# Patient Record
Sex: FEMALE | Race: WHITE | NOT HISPANIC OR LATINO | Employment: OTHER | URBAN - METROPOLITAN AREA
[De-identification: names, ages, dates, MRNs, and addresses within clinical notes are randomized per-mention and may not be internally consistent; named-entity substitution may affect disease eponyms.]

---

## 2017-03-07 ENCOUNTER — APPOINTMENT (OUTPATIENT)
Dept: PULMONOLOGY | Facility: HOSPICE | Age: 72
End: 2017-03-07
Payer: MEDICARE

## 2017-03-24 ENCOUNTER — OFFICE VISIT (OUTPATIENT)
Dept: PULMONOLOGY | Facility: HOSPICE | Age: 72
End: 2017-03-24
Payer: MEDICARE

## 2017-03-24 VITALS
HEIGHT: 60 IN | WEIGHT: 99 LBS | BODY MASS INDEX: 19.44 KG/M2 | SYSTOLIC BLOOD PRESSURE: 122 MMHG | RESPIRATION RATE: 16 BRPM | HEART RATE: 88 BPM | OXYGEN SATURATION: 98 % | DIASTOLIC BLOOD PRESSURE: 78 MMHG

## 2017-03-24 DIAGNOSIS — J44.9 CHRONIC OBSTRUCTIVE PULMONARY DISEASE, UNSPECIFIED COPD TYPE (HCC): ICD-10-CM

## 2017-03-24 DIAGNOSIS — R05.8 PRODUCTIVE COUGH: ICD-10-CM

## 2017-03-24 DIAGNOSIS — Z99.81 OXYGEN DEPENDENT: ICD-10-CM

## 2017-03-24 PROCEDURE — 4040F PNEUMOC VAC/ADMIN/RCVD: CPT | Performed by: NURSE PRACTITIONER

## 2017-03-24 PROCEDURE — G8484 FLU IMMUNIZE NO ADMIN: HCPCS | Performed by: NURSE PRACTITIONER

## 2017-03-24 PROCEDURE — 99214 OFFICE O/P EST MOD 30 MIN: CPT | Performed by: NURSE PRACTITIONER

## 2017-03-24 PROCEDURE — G8432 DEP SCR NOT DOC, RNG: HCPCS | Performed by: NURSE PRACTITIONER

## 2017-03-24 PROCEDURE — 1101F PT FALLS ASSESS-DOCD LE1/YR: CPT | Mod: 8P | Performed by: NURSE PRACTITIONER

## 2017-03-24 PROCEDURE — 3014F SCREEN MAMMO DOC REV: CPT | Mod: 8P | Performed by: NURSE PRACTITIONER

## 2017-03-24 PROCEDURE — 1036F TOBACCO NON-USER: CPT | Performed by: NURSE PRACTITIONER

## 2017-03-24 PROCEDURE — 3017F COLORECTAL CA SCREEN DOC REV: CPT | Mod: 8P | Performed by: NURSE PRACTITIONER

## 2017-03-24 PROCEDURE — G8420 CALC BMI NORM PARAMETERS: HCPCS | Performed by: NURSE PRACTITIONER

## 2017-03-24 RX ORDER — AZITHROMYCIN 250 MG/1
TABLET, FILM COATED ORAL
Qty: 6 TAB | Refills: 0 | Status: SHIPPED | OUTPATIENT
Start: 2017-03-24 | End: 2017-09-29 | Stop reason: SDUPTHER

## 2017-03-24 RX ORDER — PREDNISONE 10 MG/1
TABLET ORAL
Qty: 18 TAB | Refills: 0 | Status: SHIPPED | OUTPATIENT
Start: 2017-03-24 | End: 2017-10-24

## 2017-03-24 NOTE — PATIENT INSTRUCTIONS
1. Continue inhaler regimen. Continue nebulizer 2-4x's daily.  2. RX for zpak/prednisone for emergencies.  3. Order sent to [facility] for smaller o2 tanks.  4. [place] with DLCO at next OV.  5. Follow up in 4 months with Kristina CLEVELAND, sooner if needed.   6. Due for Pneumovax 23 9/2017.

## 2017-03-24 NOTE — MR AVS SNAPSHOT
Yolis Armstrong   3/24/2017 9:20 AM   Office Visit   MRN: [id number]    Department:  [facility]   Dept Phone:  [phone]    Description:  Female : 1945   Provider:  SCAR HurtadoRSHANNAN.           Reason for Visit     Follow-Up           Allergies as of 3/24/2017     Allergen Noted Reactions    Amoxicillin 2016       Codeine 2016       Levaquin 2016       Sulfa Drugs 2016         You were diagnosed with     Chronic obstructive pulmonary disease, unspecified COPD type (CMS-HCC)   [1130907]       Oxygen dependent   [508252]       Productive cough   [997454]         Vital Signs     Blood Pressure Pulse Respirations Height Weight Body Mass Index    122/78 mmHg 88 16 1.524 m (5') 44.906 kg (99 lb) 19.33 kg/m2    Oxygen Saturation Smoking Status                98% Former Smoker          Basic Information     Date Of Birth Sex Race Ethnicity Preferred Language    1945 Female White Non- English      Your appointments     2017  9:00 AM   Pulmonary Function Test with PFT-RM3   [facility] (--)    [address] [facility] [address]  [place] [id number] [phone]            2017 10:00 AM   Established Patient Pul with RITA Humphrey   [facility] (--)    [address] [facility] [address]  [place] [id number] [phone]              Problem List              ICD-10-CM Priority Class Noted - Resolved    COPD (chronic obstructive pulmonary disease) (CMS-HCC) J44.9   2016 - Present    Oxygen dependent Z99.81   3/24/2017 - Present    Productive cough R05   3/24/2017 - Present      Health Maintenance        Date Due Completion Dates    IMM DTaP/Tdap/Td Vaccine (1 - Tdap) 1962 ---    PAP SMEAR 1964 ---    COLONOSCOPY 1993 ---    IMM ZOSTER VACCINE 2003 ---    BONE DENSITY 2008 ---    MAMMOGRAM 2/3/2011 2/3/2010, 2/3/2010, 10/2/2007, 10/2/2007, 3/27/2006, 3/23/2005, 3/18/2004     IMM INFLUENZA (1) 9/1/2016 ---    IMM PNEUMOCOCCAL 65+ (ADULT) LOW/MEDIUM RISK SERIES (2 of 2 - PPSV23) 9/7/2017 9/7/2016            Current Immunizations     13-VALENT PCV PREVNAR 9/7/2016      Below and/or attached are the medications your provider expects you to take. Review all of your home medications and newly ordered medications with your provider and/or pharmacist. Follow medication instructions as directed by your provider and/or pharmacist. Please keep your medication list with you and share with your provider. Update the information when medications are discontinued, doses are changed, or new medications (including over-the-counter products) are added; and carry medication information at all times in the event of emergency situations     Allergies:  AMOXICILLIN - (reactions not documented)     CODEINE - (reactions not documented)     LEVAQUIN - (reactions not documented)     SULFA DRUGS - (reactions not documented)               Medications  Valid as of: March 24, 2017 -  9:59 AM    Generic Name Brand Name Tablet Size Instructions for use    Albuterol Sulfate (Nebu Soln) PROVENTIL 2.5mg/3ml 2.5 mg by Nebulization route every four hours as needed for Shortness of Breath.        Azithromycin (Tab) ZITHROMAX 250 MG TAKE 2 TABLETS BY MOUTH TODAY, THEN TAKE 1 TABLET DAILY FOR 4 DAYS        Benzonatate (Cap) TESSALON 100 MG TAKE 1-2 CAPS EVERY 8 HRS AS NEEDED FOR VIGOROUS COUGH        Budesonide-Formoterol Fumarate (Aerosol) SYMBICORT 160-4.5 MCG/ACT Inhale 2 Puffs by mouth 2 Times a Day.        BusPIRone HCl (Tab) BUSPAR 7.5 MG Take 7.5 mg by mouth 3 times a day.        Calcium   Take  by mouth every day.        Docusate Calcium   Take  by mouth as needed.        Fluticasone Propionate (Suspension) FLONASE 50 MCG/ACT SQUIRT 1-2 SPRAYS INTO EACH NOSTRIL EVERY DAY        Fluticasone Propionate (Inhal) (AEROSOL POWDER, BREATH ACTIVATED) Fluticasone Propionate (Inhal) 50 MCG/BLIST Inhale  by mouth every day.  1-2 sprays each nostril daily.        Hydrocodone-Acetaminophen (Tab) VICODIN ES 7.5-750 MG Take 1 Tab by mouth as needed.        Ipratropium-Albuterol (Aero Soln) COMBIVENT RESPIMAT  MCG/ACT Inhale 1 Puff by mouth 4 times a day as needed (for shortness of breath/wheezing).        LORazepam (Tab) ATIVAN 1 MG Take 1 mg by mouth every day. 1-3 tabs daily as needed for anxiety.        Potassium (Tab) Potassium 75 MG Take  by mouth every day.        PredniSONE (Tab) DELTASONE 10 MG Take 30mg x 3 days, then take 20mg x 3 days, then take 10mg x 3 days, with food, then discontinue.        Tiotropium Bromide Monohydrate (Aero Soln) Tiotropium Bromide Monohydrate 2.5 MCG/ACT Inhale 2 Inhalation by mouth every day. Assemble and prime.        .                 Medicines prescribed today were sent to:     [id number] - [place], [place] - [id number] [facility] [address]    Phone: [phone] Fax: [phone]    Open 24 Hours?: No      Medication refill instructions:       If your prescription bottle indicates you have medication refills left, it is not necessary to call your provider’s office. Please contact your pharmacy and they will refill your medication.    If your prescription bottle indicates you do not have any refills left, you may request refills at any time through one of the following ways: The online [employer] system (except Urgent Care), by calling your provider’s office, or by asking your pharmacy to contact your provider’s office with a refill request. Medication refills are processed only during regular business hours and may not be available until the next business day. Your provider may request additional information or to have a follow-up visit with you prior to refilling your medication.   *Please Note: Medication refills are assigned a new Rx number when refilled electronically. Your pharmacy may indicate that no refills were authorized even though a new  prescription for the same medication is available at the pharmacy. Please request the medicine by name with the pharmacy before contacting your provider for a refill.        Your To Do List     Future Labs/Procedures Complete By Expires    AMB PULMONARY FUNCTION TEST/LAB  As directed 3/24/2018    Comments:    SPIROMETRY WITH DLCO ONLY      Instructions    1. Continue inhaler regimen. Continue nebulizer 2-4x's daily.  2. RX for zpak/prednisone for emergencies.  3. Order sent to [facility] for smaller o2 tanks.  4. [place] with DLCO at next OV.  5. Follow up in 4 months with Kristina CLEVELAND, sooner if needed.   6. Due for Pneumovax 23 9/2017.          MyChart Status: Patient Declined

## 2017-03-24 NOTE — PROGRESS NOTES
Chief Complaint   Patient presents with   • Follow-Up       HPI:  Yolis Armstrong is a 71 y.o. year old female here today for follow-up on very severe COPD with her daughter. Pulmonary function tests in 2013 indicate FEV1 2.12 L, 27% predicted with positive bronchodilator response, FEV1/FVC 37% predicted, and DLCO 33% predicted. The patient is compliant with Symbicort 160/4.5 µg 2 inhalations twice daily with mouth rinse, Spiriva daily, Combivent 2-3x/day dependent on weather and albuterol nebulized treatments 2x/day as needed. Patient is currently on 4 L pulsed oxygen and 4 L continuous flow 24-7. Last OV patient requalified for oxygen needed 4L continues. She is requesting portable POC today instead of tanks.       Patient reports her breathing has been stable since last office visit but in the last few days noticed increased dyspnea, cough with phlegm production and whezing.  She uses wheelchair for long distances due to dyspnea with minimal exertion. She denies wheeze, hemoptysis, chest pain, fevers and chills, nasal congestion, morning headache. Change in weather increases her shortness of breath and requires increased use of rescue inhalers. She has run out of emergency abx/steroid.    ROS: As per HPI and otherwise negative if not stated.    Past Medical History   Diagnosis Date   • COPD (chronic obstructive pulmonary disease) (CMS-[facility])    • Hypertension    • Adenocarcinoma (CMS-[facility])    • Abnormal finding on radiology exam        Past Surgical History   Procedure Laterality Date   • Mastectomy     • Other       Respiratory failure       Family History   Problem Relation Age of Onset   • Dementia Mother        Social History     Social History   • Marital Status:      Spouse Name: N/A   • Number of Children: N/A   • Years of Education: N/A     Occupational History   • Not on file.     Social History Main Topics   • Smoking status: Former Smoker -- 2.00 packs/day for 35 years     Types: Cigarettes     Quit  date: 01/01/2003   • Smokeless tobacco: Never Used      Comment: Positive for passive smoke exposure   • Alcohol Use: 0.6 oz/week     0 Standard drinks or equivalent, 1 Glasses of wine per week      Comment: occ   • Drug Use: No   • Sexual Activity: Not on file     Other Topics Concern   • Not on file     Social History Narrative       Allergies as of 03/24/2017 - [place] as Reviewed 03/24/2017   Allergen Reaction Noted   • Amoxicillin  08/25/2016   • Codeine  08/25/2016   • Levaquin  08/25/2016   • Sulfa drugs  08/25/2016        @Vital signs for this encounter:  Filed Vitals:    03/24/17 0919   Height: 1.524 m (5')   Weight: 44.906 kg (99 lb)   Weight % change since last entry.: 0 %   BP: 122/78   Pulse: 88   BMI (Calculated): 19.33   Resp: 16       Current medications as of today   Current Outpatient Prescriptions   Medication Sig Dispense Refill   • fluticasone (FLONASE) 50 MCG/ACT nasal spray SQUIRT 1-2 SPRAYS INTO EACH NOSTRIL EVERY DAY 1 Bottle 5   • azithromycin (ZITHROMAX) 250 MG Tab TAKE 2 TABLETS BY MOUTH TODAY, THEN TAKE 1 TABLET DAILY FOR 4 DAYS 6 Tab 0   • budesonide-formoterol (SYMBICORT) 160-4.5 MCG/ACT Aerosol Inhale 2 Puffs by mouth 2 Times a Day.     • Tiotropium Bromide Monohydrate (SPIRIVA RESPIMAT) 2.5 MCG/ACT Aero Soln Inhale 2 Inhalation by mouth every day. Assemble and prime. 1 Inhaler 5   • Fluticasone Propionate, Inhal, 50 MCG/BLIST AEROSOL POWDER, BREATH ACTIVATED Inhale  by mouth every day. 1-2 sprays each nostril daily.     • ipratropium-albuterol (COMBIVENT RESPIMAT)  MCG/ACT Aero Soln Inhale 1 Puff by mouth 4 times a day as needed (for shortness of breath/wheezing).     • albuterol (PROVENTIL) 2.5mg/3ml Nebu Soln solution for nebulization 2.5 mg by Nebulization route every four hours as needed for Shortness of Breath.     • hydrocodone-acetaminophen (VICODIN ES) 7.5-750 MG per tablet Take 1 Tab by mouth as needed.     • lorazepam (ATIVAN) 1 MG Tab Take 1 mg by mouth every day. 1-3  tabs daily as needed for anxiety.     • DOCUSATE CALCIUM PO Take  by mouth as needed.     • busPIRone (BUSPAR) 7.5 MG tablet Take 7.5 mg by mouth 3 times a day.     • CALCIUM PO Take  by mouth every day.     • Potassium 75 MG Tab Take  by mouth every day.     • benzonatate (TESSALON) 100 MG Cap TAKE 1-2 CAPS EVERY 8 HRS AS NEEDED FOR VIGOROUS COUGH 30 Cap 5     No current facility-administered medications for this visit.         Physical Exam:   Gen:           Alert and oriented, No apparent distress. Mood and affect appropriate, normal interaction with examiner.  Eyes:          PERRL, EOM intact, sclere white, conjunctive moist.  Ears:          Not examined.  Hearing:     Grossly intact.  Nose:          Normal, no lesions or deformities.  Dentition:    Good dentition.  Oropharynx:   Tongue normal, posterior pharynx without erythema or exudate.  Mallampati Classification: 3  Neck:        Supple, trachea midline, no masses.  Respiratory Effort: No intercostal retractions or use of accessory muscles.   Lung Auscultation:      Significantly diminished t/o; no rales, rhonchi or wheezing.  CV:            Regular rate and rhythm. No murmurs, rubs or gallops.  Abd:           Not examined.   Lymphadenopathy: Not examined.  Gait and Station: In wheelchair.  Digits and Nails: No clubbing, cyanosis, petechiae, or nodes.   Cranial Nerves: II-XII grossly intact.  Skin:        No rashes, lesions or ulcers noted.               Ext:           No cyanosis or edema.      Assessment:  1. Chronic obstructive pulmonary disease, unspecified COPD type (CMS-HCC)     2. Oxygen dependent         Immunizations:    Flu:declines, rxn in the past  Pneumovax 23:due 9/2017  Prevnar 13:9/2016    Plan:  1. Continue inhaler regimen. Continue nebulizer 2-4x's daily.  2. RX for zpak/prednisone for emergencies. May start now for symptoms.  3. DME order sent to [facility] for smaller o2 tanks.  4. [place] with DLCO at next OV.  5. Follow up in 4 months with  Kristina More APRN, sooner if needed.   6. Due for Pneumovax 23 9/2017.

## 2017-07-18 ENCOUNTER — APPOINTMENT (OUTPATIENT)
Dept: PULMONOLOGY | Facility: HOSPICE | Age: 72
End: 2017-07-18
Payer: MEDICARE

## 2017-08-11 ENCOUNTER — APPOINTMENT (OUTPATIENT)
Dept: PULMONOLOGY | Facility: HOSPICE | Age: 72
End: 2017-08-11
Payer: MEDICARE

## 2017-09-14 ENCOUNTER — NON-PROVIDER VISIT (OUTPATIENT)
Dept: PULMONOLOGY | Facility: HOSPICE | Age: 72
End: 2017-09-14
Payer: MEDICARE

## 2017-09-14 ENCOUNTER — OFFICE VISIT (OUTPATIENT)
Dept: PULMONOLOGY | Facility: HOSPICE | Age: 72
End: 2017-09-14
Payer: MEDICARE

## 2017-09-14 VITALS
BODY MASS INDEX: 19.44 KG/M2 | WEIGHT: 99 LBS | DIASTOLIC BLOOD PRESSURE: 90 MMHG | RESPIRATION RATE: 16 BRPM | TEMPERATURE: 97.5 F | SYSTOLIC BLOOD PRESSURE: 120 MMHG | HEART RATE: 59 BPM | HEIGHT: 60 IN

## 2017-09-14 DIAGNOSIS — J44.9 CHRONIC OBSTRUCTIVE PULMONARY DISEASE, UNSPECIFIED COPD TYPE (HCC): ICD-10-CM

## 2017-09-14 DIAGNOSIS — Z79.899 MEDICATION MANAGEMENT: ICD-10-CM

## 2017-09-14 PROCEDURE — 90732 PPSV23 VACC 2 YRS+ SUBQ/IM: CPT | Performed by: NURSE PRACTITIONER

## 2017-09-14 PROCEDURE — 94726 PLETHYSMOGRAPHY LUNG VOLUMES: CPT | Performed by: INTERNAL MEDICINE

## 2017-09-14 PROCEDURE — G0009 ADMIN PNEUMOCOCCAL VACCINE: HCPCS | Performed by: NURSE PRACTITIONER

## 2017-09-14 PROCEDURE — 99213 OFFICE O/P EST LOW 20 MIN: CPT | Mod: 25 | Performed by: NURSE PRACTITIONER

## 2017-09-14 PROCEDURE — 94060 EVALUATION OF WHEEZING: CPT | Performed by: INTERNAL MEDICINE

## 2017-09-14 PROCEDURE — 94729 DIFFUSING CAPACITY: CPT | Performed by: INTERNAL MEDICINE

## 2017-09-14 RX ORDER — AZITHROMYCIN 250 MG/1
TABLET, FILM COATED ORAL
Qty: 6 TAB | Refills: 2 | Status: SHIPPED | OUTPATIENT
Start: 2017-09-14 | End: 2017-09-29

## 2017-09-14 RX ORDER — BENZONATATE 100 MG/1
200 CAPSULE ORAL 3 TIMES DAILY PRN
Qty: 90 CAP | Refills: 3 | Status: SHIPPED | OUTPATIENT
Start: 2017-09-14 | End: 2019-01-01 | Stop reason: SDUPTHER

## 2017-09-14 RX ORDER — PREDNISONE 10 MG/1
TABLET ORAL
Qty: 30 TAB | Refills: 2 | Status: SHIPPED | OUTPATIENT
Start: 2017-09-14 | End: 2017-10-24 | Stop reason: SDUPTHER

## 2017-09-14 ASSESSMENT — PULMONARY FUNCTION TESTS
FEV1/FVC_PERCENT_PREDICTED: 56
FEV1_PREDICTED: 1.74
FEV1/FVC: 41.46
FVC: 1.23
FEV1/FVC: 40
FVC_PERCENT_PREDICTED: 47
FEV1_PERCENT_CHANGE: 14
FEV1/FVC_PERCENT_PREDICTED: 75
FEV1: .51
FEV1/FVC_PERCENT_PREDICTED: 53
FEV1_PERCENT_PREDICTED: 29
FEV1/FVC_PERCENT_CHANGE: 140
FEV1: .44
FVC_PREDICTED: 2.33
FEV1_PERCENT_CHANGE: 10
FEV1_PERCENT_PREDICTED: 25
FVC_PERCENT_PREDICTED: 52
FVC: 1.11

## 2017-09-14 NOTE — PROCEDURES
Technician: Garland Ni RRT/CPFT  Good patient effort & cooperation. Except for the DLCO, the results of this test meet the ATS standards for acceptability and repeatability.  The DLCO appears valid. The DLCO was uncorrected for Hgb.  A bronchodilator of Ventolin HFA- 2 puffs via spacer  Administered.    1.  Spirometry shows very severe airflow obstruction without a significant improvement after bronchodilator per ATS criteria.  2.  Lung volumes show moderate hyperinflation and severe air trapping  3.  Diffusion capacity is severely reduced  4.  Flow volume loops are consistent with severe obstructive lung disease    Overall Impression: Severe obstructive lung disease without significant reactivity and associated with a severe diffusion defect
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
## 2017-09-14 NOTE — PATIENT INSTRUCTIONS
1. Continue Symbicort 160/4.5 µg 2 inhalations twice daily and Combivent and albuterol neb treatments as needed.  2. Continue oxygen at 2 L/m. Keep oxygen saturations 88-94%.  3. Order for Z-Jerome and prednisone to have on hand for quick treatment of bronchitic symptoms.  4. Tessalon Perles to have on hand for cough.

## 2017-09-29 ENCOUNTER — OFFICE VISIT (OUTPATIENT)
Dept: PULMONOLOGY | Facility: HOSPICE | Age: 72
End: 2017-09-29
Payer: MEDICARE

## 2017-09-29 VITALS
BODY MASS INDEX: 19.44 KG/M2 | WEIGHT: 99 LBS | TEMPERATURE: 98.6 F | HEART RATE: 78 BPM | OXYGEN SATURATION: 91 % | DIASTOLIC BLOOD PRESSURE: 92 MMHG | SYSTOLIC BLOOD PRESSURE: 146 MMHG | HEIGHT: 60 IN | RESPIRATION RATE: 16 BRPM

## 2017-09-29 DIAGNOSIS — Z99.81 OXYGEN DEPENDENT: ICD-10-CM

## 2017-09-29 DIAGNOSIS — R05.8 PRODUCTIVE COUGH: ICD-10-CM

## 2017-09-29 DIAGNOSIS — J44.9 CHRONIC OBSTRUCTIVE PULMONARY DISEASE, UNSPECIFIED COPD TYPE (HCC): ICD-10-CM

## 2017-09-29 PROCEDURE — 99213 OFFICE O/P EST LOW 20 MIN: CPT | Performed by: NURSE PRACTITIONER

## 2017-09-29 RX ORDER — BUDESONIDE AND FORMOTEROL FUMARATE DIHYDRATE 160; 4.5 UG/1; UG/1
2 AEROSOL RESPIRATORY (INHALATION) 2 TIMES DAILY
Qty: 1 INHALER | Refills: 5 | Status: SHIPPED | OUTPATIENT
Start: 2017-09-29

## 2017-09-29 RX ORDER — AZITHROMYCIN 250 MG/1
TABLET, FILM COATED ORAL
Qty: 6 TAB | Refills: 0 | Status: SHIPPED | OUTPATIENT
Start: 2017-09-29 | End: 2019-01-01

## 2017-09-29 NOTE — PATIENT INSTRUCTIONS
1. Continue Symbicort 160/4.5 µg 2 inhalations twice daily and Combivent and albuterol as needed.  2. Refill Z-Jerome to have on hand for chronic treatment of bronchitic symptoms.  3. Keep oxygen saturations 88-92 percent.  4. Continue oxygen 3-4 L/m.

## 2017-09-29 NOTE — PROGRESS NOTES
Chief Complaint   Patient presents with   • Follow-Up     JAMES PUGH DC 9/25/17         HPI:  This is a 71 y.o. female with a history of chronic obstructive pulmonary disease.  Pulmonary function tests from 9/14/17 indicate FEV1 0.44 L, 25% predicted with positive bronchodilator response, FEV1/FVC 40%, and DLCO 28% predicted. The patient is compliant with theSymbicort 160/4.5 µg 2 inhalations twice daily and Combivent and albuterol nebulized treatments as needed. The patient is oxygen dependent at 3-4 L/m depending on pulsed flow and continuous flow. Patient was hospitalized overnight for COPD exacerbation. The patient is currently on a Z-Jerome and prednisone. In general she is doing fairly well at this point. She denies fevers, chills, sweats, and hemoptysis. She was experiencing significant sweats when she was hospitalized.    Past Medical History:   Diagnosis Date   • Abnormal finding on radiology exam    • Adenocarcinoma (CMS-formerly [facility])    • COPD (chronic obstructive pulmonary disease) (CMS-HCC)    • Hypertension        Past Surgical History:   Procedure Laterality Date   • MASTECTOMY     • OTHER      Respiratory failure       Social History   Substance Use Topics   • Smoking status: Former Smoker     Packs/day: 2.00     Years: 35.00     Types: Cigarettes     Quit date: 1/1/2003   • Smokeless tobacco: Never Used      Comment: Positive for passive smoke exposure   • Alcohol use 0.6 oz/week     1 Glasses of wine per week      Comment: occ       ROS:   Constitutional: Denies fevers, chills, sweats, fatigue, and weight loss.  Eyes: Denies glasses.  Ears/nose/mouth/throat: Denies injury.  Cardiovascular: Denies chest pain, tightness.  Respiratory: See history of present illness.  GI: Denies heartburn, difficulty swallowing, nausea, and vomiting.  Neurological: Denies frequent headaches, dizziness, weakness.    Vitals:  Vitals:    09/29/17 1352   Height: 1.524 m (5')   Weight: 44.9 kg (99 lb)   Weight % change since last entry.:  0 %   BP: 146/92   Pulse: 78   BMI (Calculated): 19.33   Resp: 16   Temp: 37 °C (98.6 °F)   O2 sat % on O2: 91 %   O2 Flow Rate (L/min): 4       Allergies:  Amoxicillin; Codeine; Levaquin; and Sulfa drugs    Medications:  Current Outpatient Prescriptions   Medication Sig Dispense Refill   • azithromycin (ZITHROMAX) 250 MG Tab TAKE 2 TABLETS BY MOUTH TODAY, THEN TAKE 1 TABLET DAILY FOR 4 DAYS 6 Tab 0   • budesonide-formoterol (SYMBICORT) 160-4.5 MCG/ACT Aerosol Inhale 2 Puffs by mouth 2 Times a Day. 1 Inhaler 5   • Tiotropium Bromide Monohydrate (SPIRIVA RESPIMAT) 2.5 MCG/ACT Aero Soln Inhale 2 Inhalation by mouth every day. Assemble and prime. 1 Inhaler 5   • predniSONE (DELTASONE) 10 MG Tab Take 30mg x 5 days, then take 20mg x 5 days, then take 10mg x 5 days, with food, then discontinue. 30 Tab 2   • benzonatate (TESSALON) 100 MG Cap Take 2 Caps by mouth 3 times a day as needed for Cough. 90 Cap 3   • predniSONE (DELTASONE) 10 MG Tab Take 30mg x 3 days, then take 20mg x 3 days, then take 10mg x 3 days, with food, then discontinue. 18 Tab 0   • fluticasone (FLONASE) 50 MCG/ACT nasal spray SQUIRT 1-2 SPRAYS INTO EACH NOSTRIL EVERY DAY 1 Bottle 5   • Fluticasone Propionate, Inhal, 50 MCG/BLIST AEROSOL POWDER, BREATH ACTIVATED Inhale  by mouth every day. 1-2 sprays each nostril daily.     • ipratropium-albuterol (COMBIVENT RESPIMAT)  MCG/ACT Aero Soln Inhale 1 Puff by mouth 4 times a day as needed (for shortness of breath/wheezing).     • albuterol (PROVENTIL) 2.5mg/3ml Nebu Soln solution for nebulization 2.5 mg by Nebulization route every four hours as needed for Shortness of Breath.     • hydrocodone-acetaminophen (VICODIN ES) 7.5-750 MG per tablet Take 1 Tab by mouth as needed.     • lorazepam (ATIVAN) 1 MG Tab Take 1 mg by mouth every day. 1-3 tabs daily as needed for anxiety.     • DOCUSATE CALCIUM PO Take  by mouth as needed.     • busPIRone (BUSPAR) 7.5 MG tablet Take 7.5 mg by mouth 3 times a day.     •  CALCIUM PO Take  by mouth every day.     • Potassium 75 MG Tab Take  by mouth every day.       No current facility-administered medications for this visit.        PHYSICAL EXAM:  Appearance: Well-developed, well-nourished, no acute distress.  Eyes. PERRL.  Hearing: Grossly intact.  Oropharynx: Tongue normal, posterior pharynx without erythema or exudate.  Respiratory effort: No intercostal retractions or use of accessory muscles.  Lung auscultation: No crackles, wheezing.  Heart auscultation: No murmur, gallop, or rub. Regular rate and rhythm.  Extremities: No cyanosis or edema.  Gait and Station: Normal  Orientation: Oriented to time, place, and person.    Assessment:  1. Chronic obstructive pulmonary disease, unspecified COPD type (CMS-HCC)  Tiotropium Bromide Monohydrate (SPIRIVA RESPIMAT) 2.5 MCG/ACT Aero Soln   2. Oxygen dependent     3. Productive cough  azithromycin (ZITHROMAX) 250 MG Tab         Plan:  1. Continue Symbicort 160/4.5 µg 2 inhalations twice daily and Combivent and albuterol as needed.  2. Refill Z-Jerome to have on hand for chronic treatment of bronchitic symptoms.  3. Keep oxygen saturations 88-92 percent.  4. Continue oxygen 3-4 L/m.      Return in about 3 months (around 12/29/2017) for With CRISTOFER Lara.

## 2017-10-24 RX ORDER — PREDNISONE 10 MG/1
TABLET ORAL
Qty: 18 TAB | Refills: 0 | Status: SHIPPED | OUTPATIENT
Start: 2017-10-24 | End: 2018-01-05 | Stop reason: SDUPTHER

## 2017-10-24 NOTE — TELEPHONE ENCOUNTER
Have we ever prescribed this med? Yes.  If yes, what date? 3/24/17    Last OV: 9/29/17    Next OV: 1/4/18    DX: COPD    Medications: predniSONE (DELTASONE) 10 MG Tab

## 2017-11-15 DIAGNOSIS — J44.9 CHRONIC OBSTRUCTIVE PULMONARY DISEASE, UNSPECIFIED COPD TYPE (HCC): ICD-10-CM

## 2017-11-15 RX ORDER — FLUTICASONE PROPIONATE 50 MCG
SPRAY, SUSPENSION (ML) NASAL
Qty: 1 BOTTLE | Refills: 5 | Status: SHIPPED | OUTPATIENT
Start: 2017-11-15

## 2017-11-15 NOTE — TELEPHONE ENCOUNTER
Have we ever prescribed this med? Yes.  If yes, what date? 12/15/16    Last OV: 9/29/17    Next OV: 1/4/18    DX: COPD    Medications: fluticasone (FLONASE) 50 MCG/ACT nasal spray

## 2017-12-29 DIAGNOSIS — J44.9 CHRONIC OBSTRUCTIVE PULMONARY DISEASE, UNSPECIFIED COPD TYPE (HCC): ICD-10-CM

## 2017-12-29 RX ORDER — ALBUTEROL SULFATE 2.5 MG/3ML
2.5 SOLUTION RESPIRATORY (INHALATION) EVERY 4 HOURS PRN
COMMUNITY
End: 2017-12-29 | Stop reason: SDUPTHER

## 2017-12-29 RX ORDER — ALBUTEROL SULFATE 2.5 MG/3ML
2.5 SOLUTION RESPIRATORY (INHALATION) EVERY 4 HOURS PRN
Qty: 30 BULLET | Refills: 5 | Status: SHIPPED
Start: 2017-12-29 | End: 2019-01-01 | Stop reason: SDUPTHER

## 2017-12-29 NOTE — TELEPHONE ENCOUNTER
[facility] is requesting a refill of the rx Albuterol neb solution.    Have we ever prescribed this med? Yes.  If yes, what date? 9/29/17    Last OV: 9/29/17- [facility]    Next OV: 1/12/18    DX: COPD    Medications: Albuterol neb solution    Please fax RX to [facility]  [phone]  [phone]

## 2018-01-01 ENCOUNTER — TELEPHONE (OUTPATIENT)
Dept: SLEEP MEDICINE | Facility: MEDICAL CENTER | Age: 73
End: 2018-01-01

## 2018-01-01 ENCOUNTER — HOSPITAL ENCOUNTER (OUTPATIENT)
Dept: CARDIOLOGY | Facility: MEDICAL CENTER | Age: 73
End: 2018-02-12
Attending: NURSE PRACTITIONER
Payer: MEDICARE

## 2018-01-01 ENCOUNTER — APPOINTMENT (OUTPATIENT)
Dept: PULMONOLOGY | Facility: HOSPICE | Age: 73
End: 2018-01-01
Payer: MEDICARE

## 2018-01-01 ENCOUNTER — HOSPITAL ENCOUNTER (OUTPATIENT)
Dept: RADIOLOGY | Facility: MEDICAL CENTER | Age: 73
End: 2018-04-03

## 2018-01-01 ENCOUNTER — OFFICE VISIT (OUTPATIENT)
Dept: PULMONOLOGY | Facility: HOSPICE | Age: 73
End: 2018-01-01
Payer: MEDICARE

## 2018-01-01 ENCOUNTER — TELEPHONE (OUTPATIENT)
Dept: PULMONOLOGY | Facility: HOSPICE | Age: 73
End: 2018-01-01

## 2018-01-01 VITALS
BODY MASS INDEX: 18.85 KG/M2 | DIASTOLIC BLOOD PRESSURE: 78 MMHG | TEMPERATURE: 97.7 F | RESPIRATION RATE: 16 BRPM | HEIGHT: 60 IN | HEART RATE: 88 BPM | OXYGEN SATURATION: 90 % | SYSTOLIC BLOOD PRESSURE: 102 MMHG | WEIGHT: 96 LBS

## 2018-01-01 DIAGNOSIS — R60.0 PEDAL EDEMA: ICD-10-CM

## 2018-01-01 DIAGNOSIS — I27.20 PULMONARY HYPERTENSION (HCC): ICD-10-CM

## 2018-01-01 DIAGNOSIS — J44.9 CHRONIC OBSTRUCTIVE PULMONARY DISEASE, UNSPECIFIED COPD TYPE (HCC): ICD-10-CM

## 2018-01-01 DIAGNOSIS — R61 DIAPHORESIS: ICD-10-CM

## 2018-01-01 DIAGNOSIS — Z99.81 OXYGEN DEPENDENT: ICD-10-CM

## 2018-01-01 DIAGNOSIS — J96.11 CHRONIC RESPIRATORY FAILURE WITH HYPOXIA (HCC): ICD-10-CM

## 2018-01-01 DIAGNOSIS — Z87.891 FORMER SMOKER: ICD-10-CM

## 2018-01-01 LAB
LV EJECT FRACT  99904: 65
LV EJECT FRACT MOD 2C 99903: 66.35
LV EJECT FRACT MOD 4C 99902: 74.65
LV EJECT FRACT MOD BP 99901: 70.26

## 2018-01-01 PROCEDURE — 93306 TTE W/DOPPLER COMPLETE: CPT | Mod: 26 | Performed by: INTERNAL MEDICINE

## 2018-01-01 PROCEDURE — 99214 OFFICE O/P EST MOD 30 MIN: CPT | Performed by: NURSE PRACTITIONER

## 2018-01-01 PROCEDURE — 93306 TTE W/DOPPLER COMPLETE: CPT

## 2018-01-01 RX ORDER — AZITHROMYCIN 250 MG/1
TABLET, FILM COATED ORAL
Qty: 6 TAB | Refills: 0 | Status: SHIPPED | OUTPATIENT
Start: 2018-01-01 | End: 2019-01-01

## 2018-01-01 RX ORDER — PREDNISONE 10 MG/1
TABLET ORAL
Qty: 30 TAB | Refills: 0 | Status: SHIPPED | OUTPATIENT
Start: 2018-01-01 | End: 2018-01-01 | Stop reason: SDUPTHER

## 2018-01-01 RX ORDER — POTASSIUM CHLORIDE 750 MG/1
10 TABLET, EXTENDED RELEASE ORAL
Refills: 2 | COMMUNITY
Start: 2018-01-01

## 2018-01-01 RX ORDER — PREDNISONE 10 MG/1
TABLET ORAL
Refills: 0 | OUTPATIENT
Start: 2018-01-01

## 2018-01-01 RX ORDER — PREDNISONE 10 MG/1
TABLET ORAL
Qty: 30 TAB | Refills: 0 | Status: SHIPPED | OUTPATIENT
Start: 2018-01-01 | End: 2019-01-01

## 2018-01-03 ENCOUNTER — TELEPHONE (OUTPATIENT)
Dept: PULMONOLOGY | Facility: HOSPICE | Age: 73
End: 2018-01-03

## 2018-01-03 NOTE — TELEPHONE ENCOUNTER
MEDICATION PRIOR AUTHORIZATION NEEDED:    1. Name of Medication: Albuterol Sulfate 0.083%    2. Requested By (Name of Pharmacy): [facility]     3. Is insurance on file current? yes    4. What is the name & phone number of the 3rd party payor? [facility] [place] [facility] [phone]

## 2018-01-05 RX ORDER — PREDNISONE 10 MG/1
TABLET ORAL
Qty: 18 TAB | Refills: 0 | Status: SHIPPED | OUTPATIENT
Start: 2018-01-05 | End: 2018-01-17 | Stop reason: SDUPTHER

## 2018-01-05 RX ORDER — AZITHROMYCIN 250 MG/1
TABLET, FILM COATED ORAL
Qty: 6 TAB | Refills: 0 | Status: SHIPPED | OUTPATIENT
Start: 2018-01-05 | End: 2019-01-01

## 2018-01-05 NOTE — TELEPHONE ENCOUNTER
Patient is feeling under the weather, coughing up colored mucus, feels like she might need a antibiotics as well. Please let me know if we can send both to pharmacy.

## 2018-01-12 ENCOUNTER — OFFICE VISIT (OUTPATIENT)
Dept: PULMONOLOGY | Facility: HOSPICE | Age: 73
End: 2018-01-12
Payer: MEDICARE

## 2018-01-12 VITALS
HEART RATE: 86 BPM | RESPIRATION RATE: 16 BRPM | BODY MASS INDEX: 19.04 KG/M2 | HEIGHT: 60 IN | SYSTOLIC BLOOD PRESSURE: 122 MMHG | TEMPERATURE: 97.5 F | DIASTOLIC BLOOD PRESSURE: 80 MMHG | WEIGHT: 97 LBS | OXYGEN SATURATION: 95 %

## 2018-01-12 DIAGNOSIS — R60.0 PEDAL EDEMA: ICD-10-CM

## 2018-01-12 DIAGNOSIS — J44.9 CHRONIC OBSTRUCTIVE PULMONARY DISEASE, UNSPECIFIED COPD TYPE (HCC): ICD-10-CM

## 2018-01-12 DIAGNOSIS — Z99.81 OXYGEN DEPENDENT: ICD-10-CM

## 2018-01-12 DIAGNOSIS — R61 DIAPHORESIS: ICD-10-CM

## 2018-01-12 PROCEDURE — 99214 OFFICE O/P EST MOD 30 MIN: CPT | Performed by: NURSE PRACTITIONER

## 2018-01-12 RX ORDER — ZOLPIDEM TARTRATE 10 MG/1
10 TABLET ORAL
Refills: 5 | COMMUNITY
Start: 2017-10-26

## 2018-01-12 RX ORDER — IBUPROFEN 800 MG/1
800 TABLET ORAL 3 TIMES DAILY
Refills: 3 | COMMUNITY
Start: 2017-11-21

## 2018-01-12 RX ORDER — HYDROCODONE BITARTRATE AND ACETAMINOPHEN 5; 325 MG/1; MG/1
1 TABLET ORAL 3 TIMES DAILY
Refills: 0 | COMMUNITY
Start: 2017-12-21

## 2018-01-12 NOTE — PROGRESS NOTES
Chief Complaint   Patient presents with   • Follow-Up     COPD       HPI:  Yolis Armstrong is a 72 y.o. year old female here today for follow-up on Stage 3 chronic obstructive pulmonary disease.  Pulmonary function tests from 9/14/17 indicate FEV1 0.44 L, 25% predicted with positive bronchodilator response, FEV1/FVC 40%, and DLCO 28% predicted. The patient is compliant with the Symbicort 160/4.5 µg 2 inhalations twice daily and Combivent 3x's daily and albuterol nebulized treatments as needed. The patient is oxygen dependent at 3-4 L/m depending on pulsed flow and continuous flow. Patient was hospitalized overnight for COPD exacerbation fall 2017 at [facility]. The patient is currently on a Z-Jerome and prednisone for cold symptoms starting 1 week ago. She feels much better since starting medications. She notes improved SOB, reduced cough/phlegm and occasional wheezing. She notes sweating in the afternoon routinely in the last 3 months. She denies sinus or heart burn issues. She denies fevers, chills, chest pain or hemoptysis. She does have some pedal edema. Family hx of CHF. No history of Tb. She has never attended pulmonary rehab. She is requesting battery POC. She uses a wheelchair for long distances.    She declines flu vaccine due to bad rxn in the past. She has been on spiriva in the past but found no subjective benefit. Patient notes history of LLL spot on lung that has been a long time and did not warrant further imaging in the past.      ROS: As per HPI and otherwise negative if not stated.    Past Medical History:   Diagnosis Date   • Abnormal finding on radiology exam    • Adenocarcinoma (CMS-HCC)    • COPD (chronic obstructive pulmonary disease) (CMS-HCC)    • Hypertension        Past Surgical History:   Procedure Laterality Date   • MASTECTOMY     • OTHER      Respiratory failure       Family History   Problem Relation Age of Onset   • Dementia Mother        Social History     Social History   • Marital  status:      Spouse name: N/A   • Number of children: N/A   • Years of education: N/A     Occupational History   • Not on file.     Social History Main Topics   • Smoking status: Former Smoker     Packs/day: 2.00     Years: 35.00     Types: Cigarettes     Quit date: 1/1/2003   • Smokeless tobacco: Never Used      Comment: Positive for passive smoke exposure   • Alcohol use 0.6 oz/week     1 Glasses of wine per week      Comment: occ   • Drug use: No   • Sexual activity: Not on file     Other Topics Concern   • Not on file     Social History Narrative   • No narrative on file       Allergies as of 01/12/2018 - Reviewed 01/12/2018   Allergen Reaction Noted   • Amoxicillin  08/25/2016   • Codeine  08/25/2016   • Levaquin  08/25/2016   • Sulfa drugs  08/25/2016        @Vital signs for this encounter:  Vitals:    01/12/18 1242   Height: 1.524 m (5')   Weight: 44 kg (97 lb)   Weight % change since last entry.: 0 %   BP: 122/80   Pulse: 86   BMI (Calculated): 18.94   Resp: 16   Temp: 36.4 °C (97.5 °F)   O2 sat % on O2: 95 %       Current medications as of today   Current Outpatient Prescriptions   Medication Sig Dispense Refill   • hydrocodone-acetaminophen (NORCO) 5-325 MG Tab per tablet Take 1 Tab by mouth 3 times a day.  0   • ibuprofen (MOTRIN) 800 MG Tab Take 800 mg by mouth 3 times a day.  3   • zolpidem (AMBIEN) 10 MG Tab Take 10 mg by mouth. FOR SLEEP.  5   • predniSONE (DELTASONE) 10 MG Tab TAKE 30MG X 5 DAYS, THEN TAKE 20MG X 5 DAYS, THEN TAKE 10MG X 5 DAYS, WITH FOOD, THEN DISCONTINUE. 18 Tab 0   • azithromycin (ZITHROMAX) 250 MG Tab Take 2 tablets on day 1, then take 1 tablet a day for 4 days. 6 Tab 0   • albuterol (PROVENTIL) 2.5mg/3ml Nebu Soln solution for nebulization 3 mL by Nebulization route every four hours as needed for Shortness of Breath. 30 Bullet 5   • fluticasone (FLONASE) 50 MCG/ACT nasal spray SQUIRT 1-2 SPRAYS INTO EACH NOSTRIL EVERY DAY 1 Bottle 5   • budesonide-formoterol (SYMBICORT)  160-4.5 MCG/ACT Aerosol Inhale 2 Puffs by mouth 2 Times a Day. 1 Inhaler 5   • benzonatate (TESSALON) 100 MG Cap Take 2 Caps by mouth 3 times a day as needed for Cough. 90 Cap 3   • Fluticasone Propionate, Inhal, 50 MCG/BLIST AEROSOL POWDER, BREATH ACTIVATED Inhale  by mouth every day. 1-2 sprays each nostril daily.     • ipratropium-albuterol (COMBIVENT RESPIMAT)  MCG/ACT Aero Soln Inhale 1 Puff by mouth 4 times a day as needed (for shortness of breath/wheezing).     • albuterol (PROVENTIL) 2.5mg/3ml Nebu Soln solution for nebulization 2.5 mg by Nebulization route every four hours as needed for Shortness of Breath.     • hydrocodone-acetaminophen (VICODIN ES) 7.5-750 MG per tablet Take 1 Tab by mouth as needed.     • lorazepam (ATIVAN) 1 MG Tab Take 1 mg by mouth every day. 1-3 tabs daily as needed for anxiety.     • DOCUSATE CALCIUM PO Take  by mouth as needed.     • busPIRone (BUSPAR) 7.5 MG tablet Take 7.5 mg by mouth 3 times a day.     • CALCIUM PO Take  by mouth every day.     • Potassium 75 MG Tab Take  by mouth every day.     • azithromycin (ZITHROMAX) 250 MG Tab TAKE 2 TABLETS BY MOUTH TODAY, THEN TAKE 1 TABLET DAILY FOR 4 DAYS 6 Tab 0   • Tiotropium Bromide Monohydrate (SPIRIVA RESPIMAT) 2.5 MCG/ACT Aero Soln Inhale 2 Inhalation by mouth every day. Assemble and prime. 1 Inhaler 5     No current facility-administered medications for this visit.          Physical Exam:   Gen:           Alert and oriented, No apparent distress. Mood and affect appropriate, normal interaction with examiner.  Eyes:          PERRL, EOM intact, sclere white, conjunctive moist. Glasses.  Ears:          Not examined.   Hearing:     Grossly intact.  Nose:          Normal, no lesions or deformities.  Dentition:    Dentures.  Oropharynx:   Tongue normal, posterior pharynx without erythema or exudate.  Mallampati Classification: 2/3  Neck:        Supple, trachea midline, no masses.  Respiratory Effort: No intercostal retractions  or use of accessory muscles.   Lung Auscultation:      Diminished t/o with slight wheeze to RML and rhonchi that clears with cough.  CV:            Regular rate and rhythm. No murmurs, rubs or gallops.  Abd:           Not examined.   Lymphadenopathy: Not examined.  Gait and Station: In wheelchair.  Digits and Nails: No clubbing, cyanosis, petechiae, or nodes.   Cranial Nerves: II-XII grossly intact.  Skin:        No rashes, lesions or ulcers noted.               Ext:           No cyanosis but mild pedal edema L>R.      Assessment:  1. Chronic obstructive pulmonary disease, unspecified COPD type (CMS-HCC)     2. Oxygen dependent     3. Body mass index (BMI) 19.9 or less, adult         Immunizations:    Flu:declines  Pneumovax 23:2017  Prevnar 13:2016    Plan:  1. Continue inhaler regimen.  2. ECHO in 1 month to evaluate heart function due to sweating/SOB.  3. Referral to pulmonary rehab.  4. Request records from [facility] for CXR/CT imaging.  5. Continue 3-4L oxygen 24/7. DME order battery POC.  6. Follow up in 3 months with ECHO, sooner if needed.    ADDENDUM: CT scan 12/29/2016 indicated severe emphysematous changes, a pleural-based nodular density to left lung base is unchanged since 4/14/2014. Chest x-ray 9/26/2017 indicates upper lung fields are loose and probably from COPD. Some interstitial markings seen in the lower lung fields most likely chronic. No masses are noted. Findings consistent with COPD.

## 2018-01-17 DIAGNOSIS — J44.9 CHRONIC OBSTRUCTIVE PULMONARY DISEASE, UNSPECIFIED COPD TYPE (HCC): ICD-10-CM

## 2018-01-17 NOTE — TELEPHONE ENCOUNTER
Caller Name: Yolis Armstrong                 Call Back Number: [phone] (home)         Patient approves a detailed voicemail message: N\A    Have we ever prescribed this med? Yes.  If yes, what date? 1/5/18  Pharmacy requested    Last OV: 1/12/18    Next OV: 4/17/18    DX: COPD, Unspecified        Plan:  1. Continue inhaler regimen.  2. ECHO in 1 month to evaluate heart function due to sweating/SOB.  3. Referral to pulmonary rehab.  4. Request records from [facility] for CXR/CT imaging.  5. Continue 3-4L oxygen 24/7. DME order battery POC.  6. Follow up in 3 months with ECHO, sooner if needed.     ADDENDUM: CT scan 12/29/2016 indicated severe emphysematous changes, a pleural-based nodular density to left lung base is unchanged since 4/14/2014. Chest x-ray 9/26/2017 indicates upper lung fields are loose and probably from COPD. Some interstitial markings seen in the lower lung fields most likely chronic. No masses are noted. Findings consistent with COPD.

## 2018-01-19 RX ORDER — PREDNISONE 10 MG/1
TABLET ORAL
Qty: 30 TAB | Refills: 0 | Status: SHIPPED | OUTPATIENT
Start: 2018-01-19 | End: 2019-01-01

## 2018-01-30 NOTE — TELEPHONE ENCOUNTER
Have we ever prescribed this med? Yes.  If yes, what date? 1/19/18    Last OV: 1/12/18    Next OV: 4/17/18    DX: Chronic obstructive pulmonary disease, unspecified COPD type (CMS-[facility]) (J44.9)    Medications: predniSONE (DELTASONE) 10 MG Tab

## 2018-02-05 NOTE — TELEPHONE ENCOUNTER
Pt states she is taking one daily. Her refill has been requested to soon. Advised pt to call a week prior to date filled

## 2018-02-26 NOTE — TELEPHONE ENCOUNTER
Patient called and stated that [facility] told her she needs a updated order for 02 because she needs some tanks because she is traveling.    Order pended, please sign.

## 2018-02-27 NOTE — TELEPHONE ENCOUNTER
DOCUMENTATION OF PRIOR AUTH STATUS    1. Medication name and dose: Albuterol Sulfate 0.083%    2. Name and Phone # of Prescription coverage company: [employer] [phone]    3. Date Prior Auth was submitted: 1/3/2018    4. What information was given to obtain insurance decision: Clinical notes    5. Prior Auth letter Approved or Denied: Denied, must be billed through Medicare Part B    6. Pharmacy notified: No    7. Patient notified: No

## 2018-02-28 NOTE — TELEPHONE ENCOUNTER
Order sent to DME:  [facility] / [facility] [phone] / mami [phone]    I L/M for patient letting her know this has been done

## 2018-05-30 PROBLEM — I27.20 PULMONARY HYPERTENSION (HCC): Status: ACTIVE | Noted: 2018-01-01

## 2018-05-30 PROBLEM — Z87.891 FORMER SMOKER: Status: ACTIVE | Noted: 2018-01-01

## 2018-05-30 PROBLEM — J96.11 CHRONIC RESPIRATORY FAILURE WITH HYPOXIA (HCC): Status: ACTIVE | Noted: 2018-01-01

## 2018-05-30 NOTE — PROGRESS NOTES
Chief Complaint   Patient presents with   • Results     Echo       HPI:  Yolis Armstrong is a 72 y.o. year old female here today for follow-up on ECHO results. Patient is accompanied by her daughter.    History of Stage 3 chronic obstructive pulmonary disease.  Pulmonary function tests from 9/14/17 indicate FEV1 0.44 L, 25% predicted with positive bronchodilator response, FEV1/FVC 40%, and DLCO 28% predicted. The patient is compliant with the Symbicort 160/4.5 µg 2 inhalations twice daily and Combivent 3x's daily and albuterol nebulized treatments as needed. The patient is oxygen dependent at 3-5 L/m depending on pulsed flow and continuous flow. Patient was hospitalized overnight for COPD exacerbation fall 2017 at [facility]. January 2018 patient took zpak/pred taper which resolved symptoms. Today she notes dyspnea to be slightly worse due to weather changes with humidity. She has had some days needing nebulizer 3-4x's and some without it. She notes mild cough with clear phlegm but no significant chest tightness/pain or wheezing. She notes a chronic runny nose. She denies pedal edema. She notes sleeping at night is very fragmented but she does not wake dyspneic. Denies morning headaches. She does not nap during the day.     Family hx of CHF. No history of Tb. She has never attended pulmonary rehab. She is requesting battery POC. She uses a wheelchair for long distances.     She declines flu vaccine due to bad rxn in the past. She has been on spiriva in the past but found no subjective benefit. Patient notes history of LLL spot on lung that has been a long time and did not warrant further imaging in the past. CT scan 12/29/2016 indicated severe emphysematous changes, a pleural-based nodular density to left lung base is unchanged since 4/14/2014. Chest x-ray 9/26/2017 indicates upper lung fields are loose and probably from COPD. Some interstitial markings seen in the lower lung fields most likely chronic. No masses  are noted. Findings consistent with COPD.    ROS: As per HPI and otherwise negative if not stated.    Past Medical History:   Diagnosis Date   • Abnormal finding on radiology exam    • Adenocarcinoma (HCC)    • COPD (chronic obstructive pulmonary disease) (HCC)    • Hypertension        Past Surgical History:   Procedure Laterality Date   • MASTECTOMY     • OTHER      Respiratory failure       Family History   Problem Relation Age of Onset   • Dementia Mother        Social History     Social History   • Marital status:      Spouse name: N/A   • Number of children: N/A   • Years of education: N/A     Occupational History   • Not on file.     Social History Main Topics   • Smoking status: Former Smoker     Packs/day: 2.00     Years: 35.00     Types: Cigarettes     Quit date: 1/1/2003   • Smokeless tobacco: Never Used      Comment: Positive for passive smoke exposure   • Alcohol use 0.6 oz/week     1 Glasses of wine per week      Comment: occ   • Drug use: No   • Sexual activity: Not on file     Other Topics Concern   • Not on file     Social History Narrative   • No narrative on file       Allergies as of 05/30/2018 - Reviewed 05/30/2018   Allergen Reaction Noted   • Amoxicillin  08/25/2016   • Codeine  08/25/2016   • Levaquin  08/25/2016   • Sulfa drugs  08/25/2016   • Influenza vaccines Hives 01/12/2018        @Vital signs for this encounter:  Vitals:    05/30/18 1437   Height: 1.524 m (5')   Weight: 43.5 kg (96 lb)   Weight % change since last entry.: 0 %   BP: 102/78   Pulse: 88   BMI (Calculated): 18.75   Resp: 16   Temp: 36.5 °C (97.7 °F)   O2 sat % on O2: 90 %       Current medications as of today   Current Outpatient Prescriptions   Medication Sig Dispense Refill   • hydrocodone-acetaminophen (NORCO) 5-325 MG Tab per tablet Take 1 Tab by mouth 3 times a day.  0   • zolpidem (AMBIEN) 10 MG Tab Take 10 mg by mouth. FOR SLEEP.  5   • albuterol (PROVENTIL) 2.5mg/3ml Nebu Soln solution for nebulization 3 mL  by Nebulization route every four hours as needed for Shortness of Breath. 30 Bullet 5   • budesonide-formoterol (SYMBICORT) 160-4.5 MCG/ACT Aerosol Inhale 2 Puffs by mouth 2 Times a Day. 1 Inhaler 5   • Tiotropium Bromide Monohydrate (SPIRIVA RESPIMAT) 2.5 MCG/ACT Aero Soln Inhale 2 Inhalation by mouth every day. Assemble and prime. 1 Inhaler 5   • benzonatate (TESSALON) 100 MG Cap Take 2 Caps by mouth 3 times a day as needed for Cough. 90 Cap 3   • ipratropium-albuterol (COMBIVENT RESPIMAT)  MCG/ACT Aero Soln Inhale 1 Puff by mouth 4 times a day as needed (for shortness of breath/wheezing).     • lorazepam (ATIVAN) 1 MG Tab Take 1 mg by mouth every day. 1-3 tabs daily as needed for anxiety.     • DOCUSATE CALCIUM PO Take  by mouth as needed.     • busPIRone (BUSPAR) 7.5 MG tablet Take 7.5 mg by mouth 3 times a day.     • CALCIUM PO Take  by mouth every day.     • Potassium 75 MG Tab Take  by mouth every day.     • potassium chloride SA (K-DUR) 10 MEQ Tab CR Take 10 mEq by mouth every day.  2   • predniSONE (DELTASONE) 10 MG Tab TAKE 30MG X 5 DAYS, THEN TAKE 20MG X 5 DAYS, THEN TAKE 10MG X 5 DAYS, WITH FOOD, THEN DISCONTINUE. 30 Tab 0   • ibuprofen (MOTRIN) 800 MG Tab Take 800 mg by mouth 3 times a day.  3   • azithromycin (ZITHROMAX) 250 MG Tab Take 2 tablets on day 1, then take 1 tablet a day for 4 days. 6 Tab 0   • fluticasone (FLONASE) 50 MCG/ACT nasal spray SQUIRT 1-2 SPRAYS INTO EACH NOSTRIL EVERY DAY 1 Bottle 5   • azithromycin (ZITHROMAX) 250 MG Tab TAKE 2 TABLETS BY MOUTH TODAY, THEN TAKE 1 TABLET DAILY FOR 4 DAYS 6 Tab 0   • Fluticasone Propionate, Inhal, 50 MCG/BLIST AEROSOL POWDER, BREATH ACTIVATED Inhale  by mouth every day. 1-2 sprays each nostril daily.     • albuterol (PROVENTIL) 2.5mg/3ml Nebu Soln solution for nebulization 2.5 mg by Nebulization route every four hours as needed for Shortness of Breath.     • hydrocodone-acetaminophen (VICODIN ES) 7.5-750 MG per tablet Take 1 Tab by mouth as  needed.       No current facility-administered medications for this visit.          Physical Exam:   Gen:           Alert and oriented, No apparent distress. Mood and affect appropriate, normal interaction with examiner.  Eyes:          PERRL, EOM intact, sclere white, conjunctive moist. Glasses.  Ears:          Not examined.   Hearing:     Grossly intact.  Nose:          Normal, no lesions or deformities.  Dentition:    Good dentition.  Oropharynx:   Tongue normal, posterior pharynx without erythema or exudate.  Mallampati Classification: 2/3  Neck:        Supple, trachea midline, no masses.  Respiratory Effort: No intercostal retractions or use of accessory muscles.   Lung Auscultation:      Significantly diminished t/o; no rales, rhonchi or wheezing.  CV:            Regular rate and rhythm. No murmurs, rubs or gallops.  Abd:           Not examined.   Lymphadenopathy: Not examined.  Gait and Station: In wheelchair.  Digits and Nails: No clubbing, cyanosis, petechiae, or nodes.   Cranial Nerves: II-XII grossly intact.  Skin:        No rashes, lesions or ulcers noted.               Ext:           No cyanosis or edema.      Assessment:  1. Chronic obstructive pulmonary disease, unspecified COPD type (HCC)     2. Chronic respiratory failure with hypoxia (HCC)     3. Oxygen dependent     4. Former smoker     5. BMI less than 19,adult         Immunizations:    Flu:declines, rxn in past  Pneumovax 23:2017  Prevnar 13:2016    Plan:  1. On review of ECHO, overall normal function except mildly elevated pulmonary pressures. She notes worsening dyspnea.   DME CNOX on 4L O2 now. May call results. Pending results, consider starting Trilogy.  She is very compliant with oxygen use. Due to worsening COPD, patient could benefit from Trilogy to decrease the work of breathing and improve pulmonary status. Interruption of her respiratory status could lead to serious harm or death. Patient had chronic respiratory failure and inevitably  will need assistance by ventilator to support her respiratory demand and overall improve it.   2. Continue inhaler regimen.  3. Continue oxygen 3-5L 24/7.  4. Discussed respiratory hygiene.  5. Follow up in 3 months, sooner if needed.

## 2018-06-12 NOTE — TELEPHONE ENCOUNTER
Pt is requesting an order for Prednisone, states that she has a bad cough, runny nose, wheezing, no fever or chills, using neb and inhalers as directed.  Please place order if appropriate!    Last seen: 5/30/18Claudette Fischer  Next ov: 8/30/18  COPD

## 2019-01-01 ENCOUNTER — OFFICE VISIT (OUTPATIENT)
Dept: PULMONOLOGY | Facility: HOSPICE | Age: 74
End: 2019-01-01
Payer: MEDICARE

## 2019-01-01 VITALS
SYSTOLIC BLOOD PRESSURE: 110 MMHG | TEMPERATURE: 97.9 F | WEIGHT: 90 LBS | DIASTOLIC BLOOD PRESSURE: 62 MMHG | OXYGEN SATURATION: 96 % | RESPIRATION RATE: 16 BRPM | HEIGHT: 60 IN | HEART RATE: 92 BPM | BODY MASS INDEX: 17.67 KG/M2

## 2019-01-01 DIAGNOSIS — J96.11 CHRONIC RESPIRATORY FAILURE WITH HYPOXIA (HCC): ICD-10-CM

## 2019-01-01 DIAGNOSIS — J44.9 CHRONIC OBSTRUCTIVE PULMONARY DISEASE, UNSPECIFIED COPD TYPE (HCC): ICD-10-CM

## 2019-01-01 DIAGNOSIS — Z99.81 OXYGEN DEPENDENT: ICD-10-CM

## 2019-01-01 DIAGNOSIS — Z87.891 FORMER SMOKER: ICD-10-CM

## 2019-01-01 DIAGNOSIS — Z79.899 MEDICATION MANAGEMENT: ICD-10-CM

## 2019-01-01 DIAGNOSIS — I27.20 PULMONARY HYPERTENSION (HCC): ICD-10-CM

## 2019-01-01 PROCEDURE — 99214 OFFICE O/P EST MOD 30 MIN: CPT | Performed by: NURSE PRACTITIONER

## 2019-01-01 RX ORDER — BENZONATATE 100 MG/1
200 CAPSULE ORAL 3 TIMES DAILY PRN
Qty: 90 CAP | Refills: 3 | Status: SHIPPED | OUTPATIENT
Start: 2019-01-01

## 2019-01-01 RX ORDER — PREDNISONE 10 MG/1
TABLET ORAL
Qty: 18 TAB | Refills: 0 | Status: SHIPPED | OUTPATIENT
Start: 2019-01-01

## 2019-01-01 RX ORDER — AZITHROMYCIN 250 MG/1
TABLET, FILM COATED ORAL
Qty: 6 TAB | Refills: 0 | Status: SHIPPED | OUTPATIENT
Start: 2019-01-01

## 2019-01-01 RX ORDER — ALBUTEROL SULFATE 2.5 MG/3ML
2.5 SOLUTION RESPIRATORY (INHALATION) EVERY 4 HOURS PRN
Qty: 30 BULLET | Refills: 5 | Status: SHIPPED
Start: 2019-01-01

## 2019-01-02 PROBLEM — J96.11 CHRONIC RESPIRATORY FAILURE WITH HYPOXIA (HCC): Chronic | Status: ACTIVE | Noted: 2018-01-01

## 2019-01-02 NOTE — PROGRESS NOTES
Chief Complaint   Patient presents with   • COPD     last seen 5/30/18       HPI:  Yolis Armstrong is a 73 y.o. year old female here today for follow-up on severe COPD with chronic respiratory failure requiring 3-4L O2 24/7.    Patient is accompanied by her daughter.  Today she notes symptoms starting 5 days ago. She started zpak and prednisone taper 3 days ago with improvement in symptoms. She notes dyspnea not at baseline yet. She has cough with productive yellow/green phlegm that is improving. She notes wheezing and chest tightness with exertion. She presents in wheelchair today. She is overall sedentary at home. She denies any sick contacts or major illness/hospital stays since last OV. She completed CNOX 2 days ago, pending results.     PFT 9/14/17 indicate FEV1 0.44 L, 25% predicted with positive bronchodilator response, FEV1/FVC 40%, and DLCO 28% predicted. The patient is compliant with the Symbicort 160/4.5 µg 2 inhalations twice daily and Combivent 4x's daily and albuterol nebulized treatments as needed.    Patient was hospitalized overnight for COPD exacerbation fall 2017 at [facility]. January 2018 patient took zpak/pred taper which resolved symptoms.   Family hx of CHF. No history of Tb. She has never attended pulmonary rehab. She is requesting battery POC. She uses a wheelchair for long distances.  She declines flu vaccine due to bad rxn in the past.   Patient notes history of LLL spot on lung. CT scan 12/29/2016 indicated severe emphysematous changes, a pleural-based nodular density to left lung base is unchanged since 4/14/2014. Chest x-ray 9/26/2017 indicates upper lung fields are loose and probably from COPD. Some interstitial markings seen in the lower lung fields most likely chronic. No masses are noted. Findings consistent with COPD.      ROS: As per HPI and otherwise negative if not stated.    Past Medical History:   Diagnosis Date   • Abnormal finding on radiology exam    • Adenocarcinoma  ([facility])    • COPD (chronic obstructive pulmonary disease) ([facility])    • Hypertension        Past Surgical History:   Procedure Laterality Date   • MASTECTOMY     • OTHER      Respiratory failure       Family History   Problem Relation Age of Onset   • Dementia Mother        Social History     Social History   • Marital status:      Spouse name: N/A   • Number of children: N/A   • Years of education: N/A     Occupational History   • Not on file.     Social History Main Topics   • Smoking status: Former Smoker     Packs/day: 2.00     Years: 35.00     Types: Cigarettes     Quit date: 1/1/2003   • Smokeless tobacco: Never Used      Comment: Positive for passive smoke exposure   • Alcohol use 0.6 oz/week     1 Glasses of wine per week      Comment: occ   • Drug use: No   • Sexual activity: Not on file     Other Topics Concern   • Not on file     Social History Narrative   • No narrative on file       Allergies as of 01/02/2019 - Reviewed 01/02/2019   Allergen Reaction Noted   • Amoxicillin  08/25/2016   • Codeine  08/25/2016   • Levaquin  08/25/2016   • Sulfa drugs  08/25/2016   • Influenza vaccines Hives 01/12/2018        @Vital signs for this encounter:  Vitals:    01/02/19 1500 01/02/19 1533   Height:  1.524 m (5')   Weight:  40.8 kg (90 lb)   Weight % change since last entry.:  0 %   BP:  110/62   Pulse:  92   BMI (Calculated):  17.58   Resp:  16   Temp:  36.6 °C (97.9 °F)   TempSrc:  Temporal   O2 sat % on O2: 96 %    O2 Flow Rate (L/min): 4        Current medications as of today   Current Outpatient Prescriptions   Medication Sig Dispense Refill   • predniSONE (DELTASONE) 10 MG Tab TAKE 30MG X 5 DAYS, THEN TAKE 20MG X 5 DAYS, THEN TAKE 10MG X 5 DAYS, WITH FOOD, THEN DISCONTINUE. 30 Tab 0   • hydrocodone-acetaminophen (NORCO) 5-325 MG Tab per tablet Take 1 Tab by mouth 3 times a day.  0   • fluticasone (FLONASE) 50 MCG/ACT nasal spray SQUIRT 1-2 SPRAYS INTO EACH NOSTRIL EVERY DAY 1 Bottle 5   • azithromycin  (ZITHROMAX) 250 MG Tab TAKE 2 TABLETS BY MOUTH TODAY, THEN TAKE 1 TABLET DAILY FOR 4 DAYS 6 Tab 0   • budesonide-formoterol (SYMBICORT) 160-4.5 MCG/ACT Aerosol Inhale 2 Puffs by mouth 2 Times a Day. 1 Inhaler 5   • Tiotropium Bromide Monohydrate (SPIRIVA RESPIMAT) 2.5 MCG/ACT Aero Soln Inhale 2 Inhalation by mouth every day. Assemble and prime. 1 Inhaler 5   • benzonatate (TESSALON) 100 MG Cap Take 2 Caps by mouth 3 times a day as needed for Cough. 90 Cap 3   • ipratropium-albuterol (COMBIVENT RESPIMAT)  MCG/ACT Aero Soln Inhale 1 Puff by mouth 4 times a day as needed (for shortness of breath/wheezing).     • DOCUSATE CALCIUM PO Take  by mouth as needed.     • busPIRone (BUSPAR) 7.5 MG tablet Take 7.5 mg by mouth 3 times a day.     • CALCIUM PO Take  by mouth every day.     • Potassium 75 MG Tab Take  by mouth every day.     • potassium chloride SA (K-DUR) 10 MEQ Tab CR Take 10 mEq by mouth every day.  2   • ibuprofen (MOTRIN) 800 MG Tab Take 800 mg by mouth 3 times a day.  3   • zolpidem (AMBIEN) 10 MG Tab Take 10 mg by mouth. FOR SLEEP.  5   • albuterol (PROVENTIL) 2.5mg/3ml Nebu Soln solution for nebulization 3 mL by Nebulization route every four hours as needed for Shortness of Breath. 30 Bullet 5   • Fluticasone Propionate, Inhal, 50 MCG/BLIST AEROSOL POWDER, BREATH ACTIVATED Inhale  by mouth every day. 1-2 sprays each nostril daily.     • albuterol (PROVENTIL) 2.5mg/3ml Nebu Soln solution for nebulization 2.5 mg by Nebulization route every four hours as needed for Shortness of Breath.     • hydrocodone-acetaminophen (VICODIN ES) 7.5-750 MG per tablet Take 1 Tab by mouth as needed.     • lorazepam (ATIVAN) 1 MG Tab Take 1 mg by mouth every day. 1-3 tabs daily as needed for anxiety.       No current facility-administered medications for this visit.          Physical Exam:   Gen:           Alert and oriented, No apparent distress. Mood and affect appropriate, normal interaction with examiner.  Eyes:           PERRL, EOM intact, sclere white, conjunctive moist.  Ears:          Not examined.   Hearing:     Grossly intact.  Nose:          Normal, no lesions or deformities.  Dentition:    Good dentition.  Oropharynx:   Tongue normal, posterior pharynx without erythema or exudate.  Mallampati Classification: 2/3  Neck:        Supple, trachea midline, no masses.  Respiratory Effort: No intercostal retractions or use of accessory muscles.   Lung Auscultation:      Wheezing t/o; no rales. Diminished t/o.  CV:            Regular rate and rhythm. No murmurs, rubs or gallops.  Abd:           Not examined.   Lymphadenopathy: Not examined.  Gait and Station: In wheelchair.  Digits and Nails: No clubbing, cyanosis, petechiae, or nodes.   Cranial Nerves: II-XII grossly intact.  Skin:        No rashes, lesions or ulcers noted.               Ext:           No cyanosis or edema.      Assessment:  1. Chronic respiratory failure with hypoxia (HCC)     2. Chronic obstructive pulmonary disease, unspecified COPD type (HCC)     3. Pulmonary hypertension (HCC)     4. Oxygen dependent     5. Former smoker     6. BMI less than 19,adult         Immunizations:    Flu:declines, previous rxn  Pneumovax 23:2017  Prevnar 13:2016    Plan:  1. Continue inhaler regimen. Add Spiriva respimat 2.5mcg 2 puff QD.  Advise trial Trelegy 1 puff QD - Rx given to check cost - understand to stop current inhalers during trial. RX with coupon given.  2. Start nebulizer now for acute symptoms.  3. Continue oxygen 3-5LPM based on activity.  4. Will request CNOX on O2 report - consider starting patient on Trilogy with O2 bleed in pending results.  5. Encouraged routine walking.  6. Discussed respiratory hygiene.  7. Emergency zpak/pred taper given for sick times.  8. Follow up in 4 months to check symptoms, sooner if needed.    Please note that this dictation was created using voice recognition software. I have made every reasonable attempt to correct obvious errors,  but it is possible there are errors of grammar and possibly content that I did not discover before finalizing the note.

## 2019-01-09 NOTE — TELEPHONE ENCOUNTER
Have we ever prescribed this med? Yes.  If yes, what date? 09/14/2017    Last OV: 01/02/2019 - Coral Fischer    Next OV: 04/03/2019 - Coral Fischer    DX: COPD, CHRONIC COUGH     Medications: TESSALON      PATIENT STATES THAT SHE TAKES THIS EVERY DAY

## 2019-01-17 NOTE — TELEPHONE ENCOUNTER
Charmaine from [facility] called requesting rx for albuterol for pt     Have we ever prescribed this med? Yes.  If yes, what date? 12/29/17    Last OV: 1/2/19 CRISTOFER Yusuf     Next OV: 4/3/19 CRISTOFER Yusuf     DX: Chronic obstructive pulmonary disease, unspecified COPD type (HCC) (J44.9)    Medications: albuterol       Fax to [phone]

## 2019-04-03 ENCOUNTER — APPOINTMENT (OUTPATIENT)
Dept: PULMONOLOGY | Facility: HOSPICE | Age: 74
End: 2019-04-03